# Patient Record
Sex: FEMALE | Race: BLACK OR AFRICAN AMERICAN | NOT HISPANIC OR LATINO | URBAN - METROPOLITAN AREA
[De-identification: names, ages, dates, MRNs, and addresses within clinical notes are randomized per-mention and may not be internally consistent; named-entity substitution may affect disease eponyms.]

---

## 2024-01-13 ENCOUNTER — LAB REQUISITION (OUTPATIENT)
Dept: LAB | Facility: HOSPITAL | Age: 49
End: 2024-01-13
Payer: COMMERCIAL

## 2024-01-13 DIAGNOSIS — R79.89 OTHER SPECIFIED ABNORMAL FINDINGS OF BLOOD CHEMISTRY: ICD-10-CM

## 2024-01-13 LAB
ALBUMIN SERPL BCP-MCNC: 3.5 G/DL (ref 3.4–5)
ANION GAP SERPL CALC-SCNC: 9 MMOL/L (ref 10–20)
BUN SERPL-MCNC: 11 MG/DL (ref 6–23)
CALCIUM SERPL-MCNC: 8.4 MG/DL (ref 8.6–10.3)
CHLORIDE SERPL-SCNC: 108 MMOL/L (ref 98–107)
CO2 SERPL-SCNC: 26 MMOL/L (ref 21–32)
CREAT SERPL-MCNC: 0.56 MG/DL (ref 0.5–1.05)
EGFRCR SERPLBLD CKD-EPI 2021: >90 ML/MIN/1.73M*2
GLUCOSE SERPL-MCNC: 84 MG/DL (ref 74–99)
PHOSPHATE SERPL-MCNC: 3.4 MG/DL (ref 2.5–4.9)
POTASSIUM SERPL-SCNC: 4 MMOL/L (ref 3.5–5.3)
SODIUM SERPL-SCNC: 139 MMOL/L (ref 136–145)

## 2024-01-13 PROCEDURE — 80069 RENAL FUNCTION PANEL: CPT

## 2024-05-15 ENCOUNTER — APPOINTMENT (OUTPATIENT)
Dept: RADIOLOGY | Facility: HOSPITAL | Age: 49
DRG: 698 | End: 2024-05-15
Payer: MEDICARE

## 2024-05-15 ENCOUNTER — HOSPITAL ENCOUNTER (INPATIENT)
Facility: HOSPITAL | Age: 49
LOS: 3 days | Discharge: NURSING, REHAB OR LONG-TERM CARE | DRG: 698 | End: 2024-05-18
Attending: GENERAL PRACTICE | Admitting: SPECIALIST
Payer: MEDICARE

## 2024-05-15 ENCOUNTER — APPOINTMENT (OUTPATIENT)
Dept: CARDIOLOGY | Facility: HOSPITAL | Age: 49
DRG: 698 | End: 2024-05-15
Payer: MEDICARE

## 2024-05-15 DIAGNOSIS — G35 MULTIPLE SCLEROSIS (MULTI): Primary | ICD-10-CM

## 2024-05-15 PROBLEM — R41.82 ALTERED MENTAL STATUS: Status: ACTIVE | Noted: 2024-05-15

## 2024-05-15 PROBLEM — R41.0 CONFUSION: Status: ACTIVE | Noted: 2024-05-15

## 2024-05-15 PROBLEM — D72.829 LEUKOCYTOSIS: Status: ACTIVE | Noted: 2024-05-15

## 2024-05-15 PROBLEM — N39.0 UTI (URINARY TRACT INFECTION): Status: ACTIVE | Noted: 2024-05-15

## 2024-05-15 LAB
ALBUMIN SERPL BCP-MCNC: 3.9 G/DL (ref 3.4–5)
ALP SERPL-CCNC: 89 U/L (ref 33–110)
ALT SERPL W P-5'-P-CCNC: 26 U/L (ref 7–45)
ANION GAP SERPL CALC-SCNC: 14 MMOL/L (ref 10–20)
APPEARANCE UR: ABNORMAL
AST SERPL W P-5'-P-CCNC: 25 U/L (ref 9–39)
B-HCG SERPL-ACNC: 2 MIU/ML
BACTERIA #/AREA URNS AUTO: ABNORMAL /HPF
BASOPHILS # BLD AUTO: 0.03 X10*3/UL (ref 0–0.1)
BASOPHILS NFR BLD AUTO: 0.2 %
BILIRUB SERPL-MCNC: 0.9 MG/DL (ref 0–1.2)
BILIRUB UR STRIP.AUTO-MCNC: NEGATIVE MG/DL
BUN SERPL-MCNC: 15 MG/DL (ref 6–23)
CALCIUM SERPL-MCNC: 8.9 MG/DL (ref 8.6–10.3)
CARDIAC TROPONIN I PNL SERPL HS: <3 NG/L (ref 0–13)
CHLORIDE SERPL-SCNC: 104 MMOL/L (ref 98–107)
CO2 SERPL-SCNC: 25 MMOL/L (ref 21–32)
COLOR UR: YELLOW
CREAT SERPL-MCNC: 0.76 MG/DL (ref 0.5–1.05)
EGFRCR SERPLBLD CKD-EPI 2021: >90 ML/MIN/1.73M*2
EOSINOPHIL # BLD AUTO: 0.01 X10*3/UL (ref 0–0.7)
EOSINOPHIL NFR BLD AUTO: 0.1 %
ERYTHROCYTE [DISTWIDTH] IN BLOOD BY AUTOMATED COUNT: 15.6 % (ref 11.5–14.5)
GLUCOSE BLD MANUAL STRIP-MCNC: 106 MG/DL (ref 74–99)
GLUCOSE SERPL-MCNC: 103 MG/DL (ref 74–99)
GLUCOSE UR STRIP.AUTO-MCNC: NORMAL MG/DL
HCT VFR BLD AUTO: 35.9 % (ref 36–46)
HGB BLD-MCNC: 10.8 G/DL (ref 12–16)
IMM GRANULOCYTES # BLD AUTO: 0.08 X10*3/UL (ref 0–0.7)
IMM GRANULOCYTES NFR BLD AUTO: 0.5 % (ref 0–0.9)
INR PPP: 1.4 (ref 0.9–1.1)
KETONES UR STRIP.AUTO-MCNC: ABNORMAL MG/DL
LEUKOCYTE ESTERASE UR QL STRIP.AUTO: ABNORMAL
LYMPHOCYTES # BLD AUTO: 0.45 X10*3/UL (ref 1.2–4.8)
LYMPHOCYTES NFR BLD AUTO: 3.1 %
MAGNESIUM SERPL-MCNC: 1.7 MG/DL (ref 1.6–2.4)
MCH RBC QN AUTO: 26.4 PG (ref 26–34)
MCHC RBC AUTO-ENTMCNC: 30.1 G/DL (ref 32–36)
MCV RBC AUTO: 88 FL (ref 80–100)
MONOCYTES # BLD AUTO: 1.53 X10*3/UL (ref 0.1–1)
MONOCYTES NFR BLD AUTO: 10.4 %
MUCOUS THREADS #/AREA URNS AUTO: ABNORMAL /LPF
NEUTROPHILS # BLD AUTO: 12.59 X10*3/UL (ref 1.2–7.7)
NEUTROPHILS NFR BLD AUTO: 85.7 %
NITRITE UR QL STRIP.AUTO: NEGATIVE
NRBC BLD-RTO: 0 /100 WBCS (ref 0–0)
PH UR STRIP.AUTO: 7 [PH]
PLATELET # BLD AUTO: 321 X10*3/UL (ref 150–450)
POTASSIUM SERPL-SCNC: 4.2 MMOL/L (ref 3.5–5.3)
PROT SERPL-MCNC: 7.2 G/DL (ref 6.4–8.2)
PROT UR STRIP.AUTO-MCNC: ABNORMAL MG/DL
PROTHROMBIN TIME: 16.3 SECONDS (ref 9.8–12.8)
RBC # BLD AUTO: 4.09 X10*6/UL (ref 4–5.2)
RBC # UR STRIP.AUTO: ABNORMAL /UL
RBC #/AREA URNS AUTO: >20 /HPF
SODIUM SERPL-SCNC: 139 MMOL/L (ref 136–145)
SP GR UR STRIP.AUTO: 1.02
UROBILINOGEN UR STRIP.AUTO-MCNC: ABNORMAL MG/DL
WBC # BLD AUTO: 14.7 X10*3/UL (ref 4.4–11.3)
WBC #/AREA URNS AUTO: >50 /HPF

## 2024-05-15 PROCEDURE — 87086 URINE CULTURE/COLONY COUNT: CPT | Mod: AHULAB | Performed by: GENERAL PRACTICE

## 2024-05-15 PROCEDURE — 82947 ASSAY GLUCOSE BLOOD QUANT: CPT

## 2024-05-15 PROCEDURE — 80053 COMPREHEN METABOLIC PANEL: CPT | Performed by: GENERAL PRACTICE

## 2024-05-15 PROCEDURE — 99285 EMERGENCY DEPT VISIT HI MDM: CPT | Mod: 25

## 2024-05-15 PROCEDURE — 84484 ASSAY OF TROPONIN QUANT: CPT | Performed by: GENERAL PRACTICE

## 2024-05-15 PROCEDURE — 85025 COMPLETE CBC W/AUTO DIFF WBC: CPT | Performed by: GENERAL PRACTICE

## 2024-05-15 PROCEDURE — 70450 CT HEAD/BRAIN W/O DYE: CPT | Performed by: RADIOLOGY

## 2024-05-15 PROCEDURE — 85610 PROTHROMBIN TIME: CPT | Performed by: GENERAL PRACTICE

## 2024-05-15 PROCEDURE — 93005 ELECTROCARDIOGRAM TRACING: CPT

## 2024-05-15 PROCEDURE — 96365 THER/PROPH/DIAG IV INF INIT: CPT

## 2024-05-15 PROCEDURE — 2500000004 HC RX 250 GENERAL PHARMACY W/ HCPCS (ALT 636 FOR OP/ED): Performed by: GENERAL PRACTICE

## 2024-05-15 PROCEDURE — 70450 CT HEAD/BRAIN W/O DYE: CPT

## 2024-05-15 PROCEDURE — 2500000004 HC RX 250 GENERAL PHARMACY W/ HCPCS (ALT 636 FOR OP/ED): Performed by: PHYSICIAN ASSISTANT

## 2024-05-15 PROCEDURE — 84702 CHORIONIC GONADOTROPIN TEST: CPT | Performed by: GENERAL PRACTICE

## 2024-05-15 PROCEDURE — 81001 URINALYSIS AUTO W/SCOPE: CPT | Performed by: GENERAL PRACTICE

## 2024-05-15 PROCEDURE — 36415 COLL VENOUS BLD VENIPUNCTURE: CPT | Performed by: GENERAL PRACTICE

## 2024-05-15 PROCEDURE — 1200000002 HC GENERAL ROOM WITH TELEMETRY DAILY

## 2024-05-15 PROCEDURE — 2500000001 HC RX 250 WO HCPCS SELF ADMINISTERED DRUGS (ALT 637 FOR MEDICARE OP): Performed by: PHYSICIAN ASSISTANT

## 2024-05-15 PROCEDURE — 83735 ASSAY OF MAGNESIUM: CPT | Performed by: GENERAL PRACTICE

## 2024-05-15 RX ORDER — TOPIRAMATE 50 MG/1
50 TABLET, FILM COATED ORAL 2 TIMES DAILY
COMMUNITY

## 2024-05-15 RX ORDER — FERROUS SULFATE 325(65) MG
325 TABLET ORAL
COMMUNITY

## 2024-05-15 RX ORDER — ACETAMINOPHEN 325 MG/1
650 TABLET ORAL EVERY 4 HOURS PRN
COMMUNITY

## 2024-05-15 RX ORDER — ACETAMINOPHEN 325 MG/1
950 TABLET ORAL EVERY 6 HOURS PRN
Status: DISCONTINUED | OUTPATIENT
Start: 2024-05-15 | End: 2024-05-18 | Stop reason: HOSPADM

## 2024-05-15 RX ORDER — BACLOFEN 10 MG/1
TABLET ORAL 3 TIMES DAILY
COMMUNITY

## 2024-05-15 RX ORDER — CEFTRIAXONE 1 G/50ML
1 INJECTION, SOLUTION INTRAVENOUS ONCE
Status: COMPLETED | OUTPATIENT
Start: 2024-05-15 | End: 2024-05-15

## 2024-05-15 RX ORDER — TALC
3 POWDER (GRAM) TOPICAL
Status: DISCONTINUED | OUTPATIENT
Start: 2024-05-16 | End: 2024-05-18 | Stop reason: HOSPADM

## 2024-05-15 RX ORDER — DOCUSATE SODIUM 100 MG/1
100 CAPSULE, LIQUID FILLED ORAL 2 TIMES DAILY
COMMUNITY

## 2024-05-15 RX ORDER — POTASSIUM &MAGNESIUM ASPARTATE 250-250 MG
CAPSULE ORAL
COMMUNITY

## 2024-05-15 RX ORDER — SODIUM CHLORIDE 9 MG/ML
75 INJECTION, SOLUTION INTRAVENOUS CONTINUOUS
Status: DISCONTINUED | OUTPATIENT
Start: 2024-05-15 | End: 2024-05-18 | Stop reason: HOSPADM

## 2024-05-15 RX ORDER — GABAPENTIN 600 MG/1
600 TABLET ORAL 3 TIMES DAILY
COMMUNITY

## 2024-05-15 RX ORDER — SERTRALINE HYDROCHLORIDE 50 MG/1
50 TABLET, FILM COATED ORAL DAILY
COMMUNITY

## 2024-05-15 RX ORDER — CEFTRIAXONE 1 G/50ML
1 INJECTION, SOLUTION INTRAVENOUS EVERY 24 HOURS
Status: DISCONTINUED | OUTPATIENT
Start: 2024-05-15 | End: 2024-05-15 | Stop reason: SDUPTHER

## 2024-05-15 RX ORDER — CEFTRIAXONE 1 G/50ML
1 INJECTION, SOLUTION INTRAVENOUS EVERY 24 HOURS
Status: DISCONTINUED | OUTPATIENT
Start: 2024-05-16 | End: 2024-05-16

## 2024-05-15 RX ORDER — ATORVASTATIN CALCIUM 10 MG/1
10 TABLET, FILM COATED ORAL DAILY
COMMUNITY

## 2024-05-15 RX ORDER — ENOXAPARIN SODIUM 100 MG/ML
40 INJECTION SUBCUTANEOUS EVERY 24 HOURS
Status: DISCONTINUED | OUTPATIENT
Start: 2024-05-15 | End: 2024-05-18 | Stop reason: HOSPADM

## 2024-05-15 RX ORDER — AMLODIPINE BESYLATE 5 MG/1
5 TABLET ORAL DAILY
COMMUNITY

## 2024-05-15 RX ORDER — CILOSTAZOL 50 MG/1
50 TABLET ORAL 2 TIMES DAILY
COMMUNITY

## 2024-05-15 RX ORDER — SIPONIMOD 2 MG/1
2 TABLET, FILM COATED ORAL DAILY
COMMUNITY

## 2024-05-15 RX ORDER — DOCUSATE SODIUM 100 MG/1
100 CAPSULE, LIQUID FILLED ORAL 2 TIMES DAILY
Status: DISCONTINUED | OUTPATIENT
Start: 2024-05-15 | End: 2024-05-18 | Stop reason: HOSPADM

## 2024-05-15 RX ORDER — METOPROLOL SUCCINATE 25 MG/1
25 TABLET, EXTENDED RELEASE ORAL DAILY
COMMUNITY

## 2024-05-15 RX ORDER — PANTOPRAZOLE SODIUM 40 MG/1
40 TABLET, DELAYED RELEASE ORAL
Status: DISCONTINUED | OUTPATIENT
Start: 2024-05-16 | End: 2024-05-18 | Stop reason: HOSPADM

## 2024-05-15 RX ADMIN — DOCUSATE SODIUM 100 MG: 100 CAPSULE, LIQUID FILLED ORAL at 21:55

## 2024-05-15 RX ADMIN — CEFTRIAXONE SODIUM 1 G: 1 INJECTION, SOLUTION INTRAVENOUS at 19:03

## 2024-05-15 RX ADMIN — SODIUM CHLORIDE 75 ML/HR: 9 INJECTION, SOLUTION INTRAVENOUS at 21:55

## 2024-05-15 RX ADMIN — ENOXAPARIN SODIUM 40 MG: 100 INJECTION SUBCUTANEOUS at 21:55

## 2024-05-15 SDOH — SOCIAL STABILITY: SOCIAL INSECURITY: WERE YOU ABLE TO COMPLETE ALL THE BEHAVIORAL HEALTH SCREENINGS?: YES

## 2024-05-15 SDOH — SOCIAL STABILITY: SOCIAL INSECURITY: ARE YOU OR HAVE YOU BEEN THREATENED OR ABUSED PHYSICALLY, EMOTIONALLY, OR SEXUALLY BY ANYONE?: NO

## 2024-05-15 SDOH — SOCIAL STABILITY: SOCIAL INSECURITY: HAVE YOU HAD ANY THOUGHTS OF HARMING ANYONE ELSE?: NO

## 2024-05-15 SDOH — SOCIAL STABILITY: SOCIAL INSECURITY: ABUSE: ADULT

## 2024-05-15 SDOH — SOCIAL STABILITY: SOCIAL INSECURITY: DO YOU FEEL UNSAFE GOING BACK TO THE PLACE WHERE YOU ARE LIVING?: NO

## 2024-05-15 SDOH — SOCIAL STABILITY: SOCIAL INSECURITY: HAVE YOU HAD THOUGHTS OF HARMING ANYONE ELSE?: NO

## 2024-05-15 SDOH — SOCIAL STABILITY: SOCIAL INSECURITY: ARE THERE ANY APPARENT SIGNS OF INJURIES/BEHAVIORS THAT COULD BE RELATED TO ABUSE/NEGLECT?: NO

## 2024-05-15 SDOH — SOCIAL STABILITY: SOCIAL INSECURITY: DOES ANYONE TRY TO KEEP YOU FROM HAVING/CONTACTING OTHER FRIENDS OR DOING THINGS OUTSIDE YOUR HOME?: NO

## 2024-05-15 SDOH — SOCIAL STABILITY: SOCIAL INSECURITY: DO YOU FEEL ANYONE HAS EXPLOITED OR TAKEN ADVANTAGE OF YOU FINANCIALLY OR OF YOUR PERSONAL PROPERTY?: NO

## 2024-05-15 SDOH — SOCIAL STABILITY: SOCIAL INSECURITY: HAS ANYONE EVER THREATENED TO HURT YOUR FAMILY OR YOUR PETS?: NO

## 2024-05-15 ASSESSMENT — COGNITIVE AND FUNCTIONAL STATUS - GENERAL
PATIENT BASELINE BEDBOUND: YES
MOVING TO AND FROM BED TO CHAIR: A LOT
MOBILITY SCORE: 10
WALKING IN HOSPITAL ROOM: TOTAL
STANDING UP FROM CHAIR USING ARMS: TOTAL
TURNING FROM BACK TO SIDE WHILE IN FLAT BAD: A LOT
DAILY ACTIVITIY SCORE: 14
MOVING FROM LYING ON BACK TO SITTING ON SIDE OF FLAT BED WITH BEDRAILS: A LITTLE
CLIMB 3 TO 5 STEPS WITH RAILING: TOTAL
DRESSING REGULAR UPPER BODY CLOTHING: A LOT
PERSONAL GROOMING: A LITTLE
HELP NEEDED FOR BATHING: A LOT
TOILETING: A LOT
DRESSING REGULAR LOWER BODY CLOTHING: A LOT
EATING MEALS: A LITTLE

## 2024-05-15 ASSESSMENT — COLUMBIA-SUICIDE SEVERITY RATING SCALE - C-SSRS
1. IN THE PAST MONTH, HAVE YOU WISHED YOU WERE DEAD OR WISHED YOU COULD GO TO SLEEP AND NOT WAKE UP?: NO
2. HAVE YOU ACTUALLY HAD ANY THOUGHTS OF KILLING YOURSELF?: NO
6. HAVE YOU EVER DONE ANYTHING, STARTED TO DO ANYTHING, OR PREPARED TO DO ANYTHING TO END YOUR LIFE?: NO

## 2024-05-15 ASSESSMENT — PAIN SCALES - GENERAL
PAINLEVEL_OUTOF10: 0 - NO PAIN
PAINLEVEL_OUTOF10: 4
PAINLEVEL_OUTOF10: 0 - NO PAIN

## 2024-05-15 ASSESSMENT — PAIN - FUNCTIONAL ASSESSMENT
PAIN_FUNCTIONAL_ASSESSMENT: 0-10
PAIN_FUNCTIONAL_ASSESSMENT: 0-10

## 2024-05-15 ASSESSMENT — ACTIVITIES OF DAILY LIVING (ADL)
JUDGMENT_ADEQUATE_SAFELY_COMPLETE_DAILY_ACTIVITIES: NO
HEARING - RIGHT EAR: FUNCTIONAL
TOILETING: NEEDS ASSISTANCE
PATIENT'S MEMORY ADEQUATE TO SAFELY COMPLETE DAILY ACTIVITIES?: NO
DRESSING YOURSELF: NEEDS ASSISTANCE
BATHING: NEEDS ASSISTANCE
FEEDING YOURSELF: INDEPENDENT
WALKS IN HOME: DEPENDENT
HEARING - LEFT EAR: FUNCTIONAL
ADEQUATE_TO_COMPLETE_ADL: YES
ASSISTIVE_DEVICE: WHEELCHAIR;EYEGLASSES
LACK_OF_TRANSPORTATION: PATIENT DECLINED
GROOMING: NEEDS ASSISTANCE

## 2024-05-15 ASSESSMENT — PATIENT HEALTH QUESTIONNAIRE - PHQ9
SUM OF ALL RESPONSES TO PHQ9 QUESTIONS 1 & 2: 0
2. FEELING DOWN, DEPRESSED OR HOPELESS: NOT AT ALL
1. LITTLE INTEREST OR PLEASURE IN DOING THINGS: NOT AT ALL

## 2024-05-15 ASSESSMENT — LIFESTYLE VARIABLES
AUDIT-C TOTAL SCORE: 0
HOW OFTEN DO YOU HAVE A DRINK CONTAINING ALCOHOL: NEVER
HOW MANY STANDARD DRINKS CONTAINING ALCOHOL DO YOU HAVE ON A TYPICAL DAY: PATIENT DOES NOT DRINK
AUDIT-C TOTAL SCORE: 0
HOW OFTEN DO YOU HAVE 6 OR MORE DRINKS ON ONE OCCASION: NEVER
SKIP TO QUESTIONS 9-10: 1

## 2024-05-15 ASSESSMENT — PAIN DESCRIPTION - PROGRESSION: CLINICAL_PROGRESSION: NOT CHANGED

## 2024-05-15 ASSESSMENT — PAIN DESCRIPTION - LOCATION: LOCATION: BACK

## 2024-05-15 NOTE — ED TRIAGE NOTES
Pt presents to the ED for possible stroke like s/sx. Pt endorses to EMS that she wanted to leave the care facility. Pts S/SX started 45 minutes prior to arrival. Pt was having one sided ataxia. Pt endorses that she feels like things are locking up. Pt has hx of HTN and MS.

## 2024-05-15 NOTE — ED PROVIDER NOTES
HPI   Chief Complaint   Patient presents with    Stroke       HPI: 48-year-old female with a history of multiple sclerosis presents from her skilled nursing facility with confusion.  As per EMS the patient was noted to become acutely confused approximately 1 hour prior to presentation to the ED.  She has no complaints and is denying headache, chest pain, abdominal pain and fever/chills.  She is normally alert and oriented x 3 and reportedly was alert and oriented to person and place but not time.      Limitations to history: Confusion   independent Historians: EMS  External Records Reviewed: HIE, outpatient notes, inpatient notes  ------------------------------------------------------------------------------------------------------------------------------------------  ROS: a ten point review of systems was performed and was negative except as per HPI.  ------------------------------------------------------------------------------------------------------------------------------------------  PMH / PSH: as per HPI, otherwise reviewed in EMR  MEDS: as per HPI, otherwise reviewed in EMR  ALLERGIES: as per HPI, otherwise reviewed in EMR  SocH:  as per HPI, otherwise reviewed in EMR  FH:  as per HPI, otherwise reviewed in EMR  ------------------------------------------------------------------------------------------------------------------------------------------  Physical Exam:  VS: As documented in the triage note and EMR flowsheet from this visit was reviewed  General: Well appearing. No acute distress.   Eyes:  Extraocular movements grossly intact. No scleral icterus. No discharge  HEENT:  Normocephalic.  Atraumatic  Neck: Moves neck freely. No gross masses  CV: Regular rhythm. No murmurs, rubs or gallops   Resp: Clear to auscultation bilaterally. No respiratory distress.    GI: Soft, no masses, nontender. No rebound tenderness or guarding  MSK: Symmetric muscle bulk. No deformities. No lower extremity edema.    Skin:  Warm, dry, intact.   Neuro: Baseline limited mobility of the bilateral lower extremities.  No facial droop.  No sensory deficit noted.  Ataxia of the right upper extremity.  Psych: Pleasant and conversive  ------------------------------------------------------------------------------------------------------------------------------------------  Hospital Course / Medical Decision Making:  Independent Interpretations: CT head  EKG as interpreted by me: Normal sinus rhythm at 86 bpm with a normal axis, no bundle branch block no signs of acute ischemia    MDM: 48-year-old female with a history of multiple sclerosis who is bedbound presents to [facility] with confusion.  A brain attack was originally called but I do not feel that the patient's presentation is consistent with stroke.  She is having some ataxia of the right upper extremity.  CT shows no acute intracranial process.  I did speak with the patient's primary care physician who reported that this is the patient's neurological baseline.  There is a leukocytosis present.  The patient is afebrile.  Urinalysis is positive for UTI.  Her presentation if there is any confusion from her baseline most likely represents a metabolic encephalopathy from the UTI.  She was given Rocephin in the ED and was admitted to the medicine service for further evaluation.    Discussion of Management with Other Providers:   I discussed the patient/results with: Emergency medicine team    Final diagnosis and disposition as below.    Results for orders placed or performed during the hospital encounter of 05/15/24  -CBC and Auto Differential:        Result                      Value             Ref Range           WBC                         14.7 (H)          4.4 - 11.3 x*       nRBC                        0.0               0.0 - 0.0 /1*       RBC                         4.09              4.00 - 5.20 *       Hemoglobin                  10.8 (L)          12.0 - 16.0 *        Hematocrit                  35.9 (L)          36.0 - 46.0 %       MCV                         88                80 - 100 fL         MCH                         26.4              26.0 - 34.0 *       MCHC                        30.1 (L)          32.0 - 36.0 *       RDW                         15.6 (H)          11.5 - 14.5 %       Platelets                   321               150 - 450 x1*       Neutrophils %               85.7              40.0 - 80.0 %       Immature Granulocytes *     0.5               0.0 - 0.9 %         Lymphocytes %               3.1               13.0 - 44.0 %       Monocytes %                 10.4              2.0 - 10.0 %        Eosinophils %               0.1               0.0 - 6.0 %         Basophils %                 0.2               0.0 - 2.0 %         Neutrophils Absolute        12.59 (H)         1.20 - 7.70 *       Immature Granulocytes *     0.08              0.00 - 0.70 *       Lymphocytes Absolute        0.45 (L)          1.20 - 4.80 *       Monocytes Absolute          1.53 (H)          0.10 - 1.00 *       Eosinophils Absolute        0.01              0.00 - 0.70 *       Basophils Absolute          0.03              0.00 - 0.10 *  -Magnesium:        Result                      Value             Ref Range           Magnesium                   1.70              1.60 - 2.40 *  -Comprehensive metabolic panel:        Result                      Value             Ref Range           Glucose                     103 (H)           74 - 99 mg/dL       Sodium                      139               136 - 145 mm*       Potassium                   4.2               3.5 - 5.3 mm*       Chloride                    104               98 - 107 mmo*       Bicarbonate                 25                21 - 32 mmol*       Anion Gap                   14                10 - 20 mmol*       Urea Nitrogen               15                6 - 23 mg/dL        Creatinine                  0.76              0.50 -  1.05 *       eGFR                        >90               >60 mL/min/1*       Calcium                     8.9               8.6 - 10.3 m*       Albumin                     3.9               3.4 - 5.0 g/*       Alkaline Phosphatase        89                33 - 110 U/L        Total Protein               7.2               6.4 - 8.2 g/*       AST                         25                9 - 39 U/L          Bilirubin, Total            0.9               0.0 - 1.2 mg*       ALT                         26                7 - 45 U/L     -Troponin I, High Sensitivity:        Result                      Value             Ref Range           Troponin I, High Sensi*     <3                0 - 13 ng/L    -Human Chorionic Gonadotropin, Serum Quantitative:        Result                      Value             Ref Range           HCG, Beta-Quantitative      2                 <5 mIU/mL      -Urinalysis with Reflex Culture and Microscopic:        Result                      Value             Ref Range           Color, Urine                Yellow            Light-Yellow*       Appearance, Urine           Turbid (N)        Clear               Specific Gravity, Urine     1.020             1.005 - 1.035       pH, Urine                   7.0               5.0, 5.5, 6.*       Protein, Urine              100 (2+) (A)      NEGATIVE, 10*       Glucose, Urine              Normal            Normal mg/dL        Blood, Urine                0.5 (2+) (A)      NEGATIVE            Ketones, Urine              TRACE (A)         NEGATIVE mg/*       Bilirubin, Urine            NEGATIVE          NEGATIVE            Urobilinogen, Urine         6 (2+) (A)        Normal mg/dL        Nitrite, Urine              NEGATIVE          NEGATIVE            Leukocyte Esterase, Ur*                       NEGATIVE        500 Jennifer/µL (A)  -Protime-INR:        Result                      Value             Ref Range           Protime                     16.3 (H)          9.8  - 12.8 s*       INR                         1.4 (H)           0.9 - 1.1      -Microscopic Only, Urine:        Result                      Value             Ref Range           WBC, Urine                  >50 (A)           1-5, NONE /H*       RBC, Urine                  >20 (A)           NONE, 1-2, 3*       Bacteria, Urine             2+ (A)            NONE SEEN /H*       Mucus, Urine                2+                Reference ra*  -POCT GLUCOSE:        Result                      Value             Ref Range           POCT Glucose                106 (H)           74 - 99 mg/dL  CT brain attack head wo IV contrast   Final Result    No acute intracranial finding. Progression of findings associated    with multiple sclerosis compared to the exam in 2020          No evidence of intracranial hemorrhage or displaced skull fracture.          MACRO:    Joseph Schoenberger discussed the significance and urgency of this    critical finding by telephone with  MICHELLE CRENSHAW on 5/15/2024 at 4:32    pm.  (**-RCF-**) Findings:  BAT.                Signed by: Joseph Schoenberger 5/15/2024 4:32 PM    Dictation workstation:   [id number]                               [place] Coma Scale Score: 14         NIH Stroke Scale: 6             Patient History   Past Medical History:   Diagnosis Date    Acute disseminated demyelination (Multi)     Chronic pain syndrome     Cognitive communication deficit     Constipation     Depression     Dysphagia     Edema     Hypertension     MS (multiple sclerosis) (Multi)     Neuromuscular dysfunction of bladder     Personal history of urinary (tract) infections     Repeated falls     Urinary incontinence      History reviewed. No pertinent surgical history.  No family history on file.  Social History     Tobacco Use    Smoking status: Former     Current packs/day: 1.00     Types: Cigarettes    Smokeless tobacco: Never   Substance Use Topics    Alcohol use: Not on file    Drug use: Not Currently     Types:  Marijuana       Physical Exam   ED Triage Vitals   Temperature Heart Rate Respirations BP   05/15/24 1618 05/15/24 1618 05/15/24 1618 05/15/24 1618   37.7 °C (99.9 °F) 88 16 125/75      Pulse Ox Temp src Heart Rate Source Patient Position   05/15/24 1618 -- 05/15/24 1634 --   98 %  Monitor       BP Location FiO2 (%)     -- --             Physical Exam    ED Course & MDM   Diagnoses as of 05/15/24 1959   Multiple sclerosis (Multi)       Medical Decision Making      Procedure  Procedures     Monroe Hancock,   05/19/24 8775

## 2024-05-16 LAB
ALBUMIN SERPL BCP-MCNC: 3.6 G/DL (ref 3.4–5)
ALP SERPL-CCNC: 73 U/L (ref 33–110)
ALT SERPL W P-5'-P-CCNC: 20 U/L (ref 7–45)
ANION GAP SERPL CALC-SCNC: 14 MMOL/L (ref 10–20)
AST SERPL W P-5'-P-CCNC: 14 U/L (ref 9–39)
BASOPHILS # BLD AUTO: 0.03 X10*3/UL (ref 0–0.1)
BASOPHILS NFR BLD AUTO: 0.2 %
BILIRUB SERPL-MCNC: 0.6 MG/DL (ref 0–1.2)
BUN SERPL-MCNC: 12 MG/DL (ref 6–23)
CALCIUM SERPL-MCNC: 8.1 MG/DL (ref 8.6–10.3)
CHLORIDE SERPL-SCNC: 106 MMOL/L (ref 98–107)
CO2 SERPL-SCNC: 22 MMOL/L (ref 21–32)
CREAT SERPL-MCNC: 0.55 MG/DL (ref 0.5–1.05)
EGFRCR SERPLBLD CKD-EPI 2021: >90 ML/MIN/1.73M*2
EOSINOPHIL # BLD AUTO: 0.01 X10*3/UL (ref 0–0.7)
EOSINOPHIL NFR BLD AUTO: 0.1 %
ERYTHROCYTE [DISTWIDTH] IN BLOOD BY AUTOMATED COUNT: 15.5 % (ref 11.5–14.5)
GLUCOSE SERPL-MCNC: 104 MG/DL (ref 74–99)
HCT VFR BLD AUTO: 32.3 % (ref 36–46)
HGB BLD-MCNC: 9.7 G/DL (ref 12–16)
HOLD SPECIMEN: NORMAL
IMM GRANULOCYTES # BLD AUTO: 0.07 X10*3/UL (ref 0–0.7)
IMM GRANULOCYTES NFR BLD AUTO: 0.6 % (ref 0–0.9)
IRON SATN MFR SERPL: ABNORMAL %
IRON SERPL-MCNC: <10 UG/DL (ref 35–150)
LYMPHOCYTES # BLD AUTO: 0.39 X10*3/UL (ref 1.2–4.8)
LYMPHOCYTES NFR BLD AUTO: 3.2 %
MCH RBC QN AUTO: 26.2 PG (ref 26–34)
MCHC RBC AUTO-ENTMCNC: 30 G/DL (ref 32–36)
MCV RBC AUTO: 87 FL (ref 80–100)
MONOCYTES # BLD AUTO: 1.28 X10*3/UL (ref 0.1–1)
MONOCYTES NFR BLD AUTO: 10.6 %
NEUTROPHILS # BLD AUTO: 10.32 X10*3/UL (ref 1.2–7.7)
NEUTROPHILS NFR BLD AUTO: 85.3 %
NRBC BLD-RTO: 0 /100 WBCS (ref 0–0)
PLATELET # BLD AUTO: 293 X10*3/UL (ref 150–450)
POTASSIUM SERPL-SCNC: 3.5 MMOL/L (ref 3.5–5.3)
PROT SERPL-MCNC: 6.4 G/DL (ref 6.4–8.2)
RBC # BLD AUTO: 3.7 X10*6/UL (ref 4–5.2)
SODIUM SERPL-SCNC: 138 MMOL/L (ref 136–145)
TIBC SERPL-MCNC: ABNORMAL UG/DL
UIBC SERPL-MCNC: 182 UG/DL (ref 110–370)
WBC # BLD AUTO: 12.1 X10*3/UL (ref 4.4–11.3)

## 2024-05-16 PROCEDURE — 1200000002 HC GENERAL ROOM WITH TELEMETRY DAILY

## 2024-05-16 PROCEDURE — 83540 ASSAY OF IRON: CPT | Performed by: SPECIALIST

## 2024-05-16 PROCEDURE — 2500000001 HC RX 250 WO HCPCS SELF ADMINISTERED DRUGS (ALT 637 FOR MEDICARE OP): Performed by: SPECIALIST

## 2024-05-16 PROCEDURE — 85025 COMPLETE CBC W/AUTO DIFF WBC: CPT | Performed by: PHYSICIAN ASSISTANT

## 2024-05-16 PROCEDURE — 2500000001 HC RX 250 WO HCPCS SELF ADMINISTERED DRUGS (ALT 637 FOR MEDICARE OP): Performed by: PHYSICIAN ASSISTANT

## 2024-05-16 PROCEDURE — 82728 ASSAY OF FERRITIN: CPT | Mod: AHULAB | Performed by: SPECIALIST

## 2024-05-16 PROCEDURE — 2500000004 HC RX 250 GENERAL PHARMACY W/ HCPCS (ALT 636 FOR OP/ED): Performed by: PHARMACIST

## 2024-05-16 PROCEDURE — 99223 1ST HOSP IP/OBS HIGH 75: CPT | Performed by: PSYCHIATRY & NEUROLOGY

## 2024-05-16 PROCEDURE — 36415 COLL VENOUS BLD VENIPUNCTURE: CPT | Performed by: PHYSICIAN ASSISTANT

## 2024-05-16 PROCEDURE — 80053 COMPREHEN METABOLIC PANEL: CPT | Performed by: PHYSICIAN ASSISTANT

## 2024-05-16 PROCEDURE — 2500000004 HC RX 250 GENERAL PHARMACY W/ HCPCS (ALT 636 FOR OP/ED): Performed by: PHYSICIAN ASSISTANT

## 2024-05-16 RX ORDER — POTASSIUM CHLORIDE 1.5 G/1.58G
40 POWDER, FOR SOLUTION ORAL ONCE
Status: DISCONTINUED | OUTPATIENT
Start: 2024-05-16 | End: 2024-05-18 | Stop reason: HOSPADM

## 2024-05-16 RX ORDER — GABAPENTIN 300 MG/1
600 CAPSULE ORAL 3 TIMES DAILY
Status: DISCONTINUED | OUTPATIENT
Start: 2024-05-16 | End: 2024-05-18 | Stop reason: HOSPADM

## 2024-05-16 RX ORDER — AMLODIPINE BESYLATE 5 MG/1
5 TABLET ORAL DAILY
Status: DISCONTINUED | OUTPATIENT
Start: 2024-05-16 | End: 2024-05-18 | Stop reason: HOSPADM

## 2024-05-16 RX ORDER — BACLOFEN 10 MG/1
10 TABLET ORAL 3 TIMES DAILY
Status: DISCONTINUED | OUTPATIENT
Start: 2024-05-16 | End: 2024-05-18 | Stop reason: HOSPADM

## 2024-05-16 RX ORDER — CILOSTAZOL 100 MG/1
50 TABLET ORAL 2 TIMES DAILY
Status: DISCONTINUED | OUTPATIENT
Start: 2024-05-16 | End: 2024-05-18 | Stop reason: HOSPADM

## 2024-05-16 RX ORDER — FERROUS SULFATE 325(65) MG
1 TABLET ORAL
Status: DISCONTINUED | OUTPATIENT
Start: 2024-05-17 | End: 2024-05-18 | Stop reason: HOSPADM

## 2024-05-16 RX ORDER — TOPIRAMATE 25 MG/1
50 TABLET ORAL 2 TIMES DAILY
Status: DISCONTINUED | OUTPATIENT
Start: 2024-05-16 | End: 2024-05-18 | Stop reason: HOSPADM

## 2024-05-16 RX ORDER — METOPROLOL SUCCINATE 25 MG/1
25 TABLET, EXTENDED RELEASE ORAL DAILY
Status: DISCONTINUED | OUTPATIENT
Start: 2024-05-16 | End: 2024-05-18 | Stop reason: HOSPADM

## 2024-05-16 RX ORDER — ATORVASTATIN CALCIUM 10 MG/1
10 TABLET, FILM COATED ORAL DAILY
Status: DISCONTINUED | OUTPATIENT
Start: 2024-05-16 | End: 2024-05-18 | Stop reason: HOSPADM

## 2024-05-16 RX ORDER — SERTRALINE HYDROCHLORIDE 50 MG/1
50 TABLET, FILM COATED ORAL DAILY
Status: DISCONTINUED | OUTPATIENT
Start: 2024-05-16 | End: 2024-05-18 | Stop reason: HOSPADM

## 2024-05-16 RX ADMIN — BACLOFEN 10 MG: 10 TABLET ORAL at 18:07

## 2024-05-16 RX ADMIN — SODIUM CHLORIDE 75 ML/HR: 9 INJECTION, SOLUTION INTRAVENOUS at 21:20

## 2024-05-16 RX ADMIN — DOCUSATE SODIUM 100 MG: 100 CAPSULE, LIQUID FILLED ORAL at 21:20

## 2024-05-16 RX ADMIN — Medication 3 MG: at 21:20

## 2024-05-16 RX ADMIN — CEFTRIAXONE 2 G: 2 INJECTION, POWDER, FOR SOLUTION INTRAMUSCULAR; INTRAVENOUS at 21:23

## 2024-05-16 RX ADMIN — BACLOFEN 10 MG: 10 TABLET ORAL at 21:20

## 2024-05-16 RX ADMIN — PANTOPRAZOLE SODIUM 40 MG: 40 TABLET, DELAYED RELEASE ORAL at 05:46

## 2024-05-16 RX ADMIN — ENOXAPARIN SODIUM 40 MG: 100 INJECTION SUBCUTANEOUS at 21:19

## 2024-05-16 RX ADMIN — GABAPENTIN 600 MG: 300 CAPSULE ORAL at 21:20

## 2024-05-16 RX ADMIN — GABAPENTIN 600 MG: 300 CAPSULE ORAL at 18:08

## 2024-05-16 ASSESSMENT — PAIN SCALES - GENERAL: PAINLEVEL_OUTOF10: 0 - NO PAIN

## 2024-05-16 NOTE — CONSULTS
"INITIAL NEUROLOGY CONSULT NOTE    IMPRESSION:  Transient encephalopathy in the context of low-grade fever and systemic infection, which is being treated.  Multiple sclerosis with paraparesis, nystagmus and early-onset dementia.  The presentation is not suggestive of either MS exacerbation or stroke/TIA.    RECOMMENDATIONS:  No acute neurological intervention required at this time.  I am happy to see the patient again as needed or requested.  Can follow up with the neurology team at [facility] on an outpatient basis.    Estrada Trevino Jr., M.D., FAAN       History Of Present Illness  Fely Palacios is a 48 y.o. female presenting with history of multiple sclerosis.  The patient is unable to provide information, there are no notes on the chart indicating what happened, and the facility the patient came from sent no information.  I reviewed the EMS run sheet and the available EMR.  I also spoke with her nurse from the [facility], where the patient resides.    Yesterday, an STNA noted that the patient was more confused than usual in that she was asking the same question multiple times, and could not initially recognize staff members she knows.  She was last known well 40 minutes prior.  Vitals at the time were /89, pulse 88, resp 16, temp 99.6 F, and SpO2 98% on room air.    The EMS run sheet states they were called for altered mentation while at a SNF.  The patient was able to tell the paramedics that \"I just wanted to get out of there\".  She presented to the [facility] [place] and was admitted for purported stroke workup.  She was found to have a UTI and was started on antibiotics.    The NH nurse reports that the patient, at baseline speaks but has significant short-term memory issues.  She does not walk because of spasticity presumed referable to chronic MS, and she can use both arms.  The nurse indicates no recent medications.    EMR review reveals a prior history of MS, for which she is managed at [facility].  " She is on siponimod and last saw Dr. Knox in 3/2024.    Past Medical History  Past Medical History:   Diagnosis Date    Acute disseminated demyelination (Multi)     Chronic pain syndrome     Cognitive communication deficit     Constipation     Depression     Dysphagia     Edema     Hypertension     MS (multiple sclerosis) (Multi)     Neuromuscular dysfunction of bladder     Personal history of urinary (tract) infections     Repeated falls     Urinary incontinence      Surgical History  History reviewed. No pertinent surgical history.  Social History  Social History     Tobacco Use    Smoking status: Former     Current packs/day: 1.00     Types: Cigarettes    Smokeless tobacco: Never   Substance Use Topics    Drug use: Not Currently     Types: Marijuana       Scheduled medications  amLODIPine, 5 mg, oral, Daily  atorvastatin, 10 mg, oral, Daily  baclofen, 10 mg, oral, TID  cefTRIAXone, 2 g, intravenous, q24h  cilostazol, 50 mg, oral, BID  docusate sodium, 100 mg, oral, BID  enoxaparin, 40 mg, subcutaneous, q24h  [START ON 5/17/2024] ferrous sulfate (325 mg ferrous sulfate), 1 tablet, oral, Daily with breakfast  gabapentin, 600 mg, oral, TID  melatonin, 3 mg, oral, Daily  metoprolol succinate XL, 25 mg, oral, Daily  pantoprazole, 40 mg, oral, Daily before breakfast  potassium chloride, 40 mEq, oral, Once  sertraline, 50 mg, oral, Daily  topiramate, 50 mg, oral, BID      Continuous medications  sodium chloride 0.9%, 75 mL/hr, Last Rate: 75 mL/hr (05/15/24 2155)      PRN medications  PRN medications: acetaminophen      No family history on file.  Allergies  Shellfish derived  Medications Prior to Admission   Medication Sig Dispense Refill Last Dose    acetaminophen (Tylenol) 325 mg tablet Take 2 tablets (650 mg) by mouth every 4 hours if needed.       amLODIPine (Norvasc) 5 mg tablet Take 1 tablet (5 mg) by mouth once daily.       atorvastatin (Lipitor) 10 mg tablet Take 1 tablet (10 mg) by mouth once daily.        "baclofen (Lioresal) 10 mg tablet Take by mouth 3 times a day.       cilostazol (Pletal) 50 mg tablet Take 1 tablet (50 mg) by mouth 2 times a day.       cranberry 500 mg capsule Take by mouth.       docusate sodium (Colace) 100 mg capsule Take 1 capsule (100 mg) by mouth 2 times a day.       ferrous sulfate, 325 mg ferrous sulfate, tablet Take 1 tablet by mouth once daily with breakfast.       gabapentin (Neurontin) 600 mg tablet Take 1 tablet (600 mg) by mouth 3 times a day.       metoprolol succinate XL (Toprol-XL) 25 mg 24 hr tablet Take 1 tablet (25 mg) by mouth once daily. Do not crush or chew.       sertraline (Zoloft) 50 mg tablet Take 1 tablet (50 mg) by mouth once daily.       siponimod (Mayzent) 2 mg tablet tablet Take 1 tablet (2 mg) by mouth once daily.       topiramate (Topamax) 50 mg tablet Take 1 tablet (50 mg) by mouth 2 times a day.          Scheduled medications  amLODIPine, 5 mg, oral, Daily  atorvastatin, 10 mg, oral, Daily  baclofen, 10 mg, oral, TID  cefTRIAXone, 2 g, intravenous, q24h  cilostazol, 50 mg, oral, BID  docusate sodium, 100 mg, oral, BID  enoxaparin, 40 mg, subcutaneous, q24h  [START ON 5/17/2024] ferrous sulfate (325 mg ferrous sulfate), 1 tablet, oral, Daily with breakfast  gabapentin, 600 mg, oral, TID  melatonin, 3 mg, oral, Daily  metoprolol succinate XL, 25 mg, oral, Daily  pantoprazole, 40 mg, oral, Daily before breakfast  potassium chloride, 40 mEq, oral, Once  sertraline, 50 mg, oral, Daily  topiramate, 50 mg, oral, BID      Continuous medications  sodium chloride 0.9%, 75 mL/hr, Last Rate: 75 mL/hr (05/15/24 2155)      PRN medications  PRN medications: acetaminophen    Review of Systems   Unable to perform ROS: Mental status change       Last Recorded Vitals  Blood pressure 133/72, pulse 88, temperature 36.9 °C (98.4 °F), temperature source Temporal, resp. rate 18, height 1.753 m (5' 9\"), weight 108 kg (237 lb 7 oz), SpO2 98%.    CONSTITUTIONAL:  No acute " distress    CARDIOVASCULAR:  Reduced pulses in the distal legs, no edema of either arm or either leg.  No carotid bruits.    MENTAL STATUS:  Awake, alert, oriented to self, but not to place or time, with poor short-term memory (0/3 5-minute recall), no awareness of recent events, normal attention span, concentration, and reduced fund of knowledge.    SPEECH AND LANGUAGE:  Can name and repeat, follows all commands, has no dysarthria    FUNDOSCOPIC:  No papilledema    CRANIAL NERVES:  II-Vision present, visual fields full to confrontational testing    III/IV/VI--EOMs are present in all directions.  She has horizontal nystagmus with eyes in primary position, and this persists with gaze to both sides.  Pupils are symmetrically reactive in dim light.  No ptosis.    V--Normal facial sensation.    VII--No facial asymmetry.    VIII--Hearing present to voice bilaterally.    IX/X--Symmetric soft palate rise.    XI--Normal trapezius power bilaterally.    XII--Tongue protrudes without deviation.    MOTOR:  Hypertonia of both legs.  Contracture at the right knee.  Contracture of both feet in plantarflexion, worse on the left.  0/5 iliacus bilaterally.  5/5 hip abduction and adduction bilaterally.  4/5 knee flexion and extension left.  2/5 knee extension and 4/5 knee flexion right.      Normal power, tone, and bulk in both arms.    SENSORY:  Present  pin sensation in both arms and both legs without distal-proximal gradient, asymmetry, or spinal sensory level.    COORDINATION:  Normal finger-to-nose testing in both arms.  Unable to do heel-to-shin testing in either leg because of paraparesis.    REFLEXES are symmetrically depressed at the biceps, triceps, brachioradialis, absent at the patella, and ankle.  The plantar responses are equivocal.    GAIT deferred.    Relevant Results  Results for orders placed or performed during the hospital encounter of 05/15/24 (from the past 24 hour(s))   POCT GLUCOSE   Result Value Ref Range     POCT Glucose 106 (H) 74 - 99 mg/dL   Electrocardiogram, 12-lead PRN ACS symptoms   Result Value Ref Range    Ventricular Rate 86 BPM    Atrial Rate 86 BPM    DC Interval 158 ms    QRS Duration 88 ms    QT Interval 348 ms    QTC Calculation(Bazett) 416 ms    P Axis 32 degrees    R Axis 8 degrees    T Axis 18 degrees    QRS Count 14 beats    Q Onset 215 ms    P Onset 136 ms    P Offset 195 ms    T Offset 389 ms    QTC Fredericia 392 ms   CBC and Auto Differential   Result Value Ref Range    WBC 14.7 (H) 4.4 - 11.3 x10*3/uL    nRBC 0.0 0.0 - 0.0 /100 WBCs    RBC 4.09 4.00 - 5.20 x10*6/uL    Hemoglobin 10.8 (L) 12.0 - 16.0 g/dL    Hematocrit 35.9 (L) 36.0 - 46.0 %    MCV 88 80 - 100 fL    MCH 26.4 26.0 - 34.0 pg    MCHC 30.1 (L) 32.0 - 36.0 g/dL    RDW 15.6 (H) 11.5 - 14.5 %    Platelets 321 150 - 450 x10*3/uL    Neutrophils % 85.7 40.0 - 80.0 %    Immature Granulocytes %, Automated 0.5 0.0 - 0.9 %    Lymphocytes % 3.1 13.0 - 44.0 %    Monocytes % 10.4 2.0 - 10.0 %    Eosinophils % 0.1 0.0 - 6.0 %    Basophils % 0.2 0.0 - 2.0 %    Neutrophils Absolute 12.59 (H) 1.20 - 7.70 x10*3/uL    Immature Granulocytes Absolute, Automated 0.08 0.00 - 0.70 x10*3/uL    Lymphocytes Absolute 0.45 (L) 1.20 - 4.80 x10*3/uL    Monocytes Absolute 1.53 (H) 0.10 - 1.00 x10*3/uL    Eosinophils Absolute 0.01 0.00 - 0.70 x10*3/uL    Basophils Absolute 0.03 0.00 - 0.10 x10*3/uL   Magnesium   Result Value Ref Range    Magnesium 1.70 1.60 - 2.40 mg/dL   Comprehensive metabolic panel   Result Value Ref Range    Glucose 103 (H) 74 - 99 mg/dL    Sodium 139 136 - 145 mmol/L    Potassium 4.2 3.5 - 5.3 mmol/L    Chloride 104 98 - 107 mmol/L    Bicarbonate 25 21 - 32 mmol/L    Anion Gap 14 10 - 20 mmol/L    Urea Nitrogen 15 6 - 23 mg/dL    Creatinine 0.76 0.50 - 1.05 mg/dL    eGFR >90 >60 mL/min/1.73m*2    Calcium 8.9 8.6 - 10.3 mg/dL    Albumin 3.9 3.4 - 5.0 g/dL    Alkaline Phosphatase 89 33 - 110 U/L    Total Protein 7.2 6.4 - 8.2 g/dL    AST 25 9 - 39  U/L    Bilirubin, Total 0.9 0.0 - 1.2 mg/dL    ALT 26 7 - 45 U/L   Troponin I, High Sensitivity   Result Value Ref Range    Troponin I, High Sensitivity <3 0 - 13 ng/L   Human Chorionic Gonadotropin, Serum Quantitative   Result Value Ref Range    HCG, Beta-Quantitative 2 <5 mIU/mL   Protime-INR   Result Value Ref Range    Protime 16.3 (H) 9.8 - 12.8 seconds    INR 1.4 (H) 0.9 - 1.1   Urinalysis with Reflex Culture and Microscopic   Result Value Ref Range    Color, Urine Yellow Light-Yellow, Yellow, Dark-Yellow    Appearance, Urine Turbid (N) Clear    Specific Gravity, Urine 1.020 1.005 - 1.035    pH, Urine 7.0 5.0, 5.5, 6.0, 6.5, 7.0, 7.5, 8.0    Protein, Urine 100 (2+) (A) NEGATIVE, 10 (TRACE), 20 (TRACE) mg/dL    Glucose, Urine Normal Normal mg/dL    Blood, Urine 0.5 (2+) (A) NEGATIVE    Ketones, Urine TRACE (A) NEGATIVE mg/dL    Bilirubin, Urine NEGATIVE NEGATIVE    Urobilinogen, Urine 6 (2+) (A) Normal mg/dL    Nitrite, Urine NEGATIVE NEGATIVE    Leukocyte Esterase, Urine 500 Jennifer/µL (A) NEGATIVE   Extra Urine Gray Tube   Result Value Ref Range    Extra Tube Hold for add-ons.    Microscopic Only, Urine   Result Value Ref Range    WBC, Urine >50 (A) 1-5, NONE /HPF    RBC, Urine >20 (A) NONE, 1-2, 3-5 /HPF    Bacteria, Urine 2+ (A) NONE SEEN /HPF    Mucus, Urine 2+ Reference range not established. /LPF   Comprehensive metabolic panel   Result Value Ref Range    Glucose 104 (H) 74 - 99 mg/dL    Sodium 138 136 - 145 mmol/L    Potassium 3.5 3.5 - 5.3 mmol/L    Chloride 106 98 - 107 mmol/L    Bicarbonate 22 21 - 32 mmol/L    Anion Gap 14 10 - 20 mmol/L    Urea Nitrogen 12 6 - 23 mg/dL    Creatinine 0.55 0.50 - 1.05 mg/dL    eGFR >90 >60 mL/min/1.73m*2    Calcium 8.1 (L) 8.6 - 10.3 mg/dL    Albumin 3.6 3.4 - 5.0 g/dL    Alkaline Phosphatase 73 33 - 110 U/L    Total Protein 6.4 6.4 - 8.2 g/dL    AST 14 9 - 39 U/L    Bilirubin, Total 0.6 0.0 - 1.2 mg/dL    ALT 20 7 - 45 U/L   CBC and Auto Differential   Result Value Ref  Range    WBC 12.1 (H) 4.4 - 11.3 x10*3/uL    nRBC 0.0 0.0 - 0.0 /100 WBCs    RBC 3.70 (L) 4.00 - 5.20 x10*6/uL    Hemoglobin 9.7 (L) 12.0 - 16.0 g/dL    Hematocrit 32.3 (L) 36.0 - 46.0 %    MCV 87 80 - 100 fL    MCH 26.2 26.0 - 34.0 pg    MCHC 30.0 (L) 32.0 - 36.0 g/dL    RDW 15.5 (H) 11.5 - 14.5 %    Platelets 293 150 - 450 x10*3/uL    Neutrophils % 85.3 40.0 - 80.0 %    Immature Granulocytes %, Automated 0.6 0.0 - 0.9 %    Lymphocytes % 3.2 13.0 - 44.0 %    Monocytes % 10.6 2.0 - 10.0 %    Eosinophils % 0.1 0.0 - 6.0 %    Basophils % 0.2 0.0 - 2.0 %    Neutrophils Absolute 10.32 (H) 1.20 - 7.70 x10*3/uL    Immature Granulocytes Absolute, Automated 0.07 0.00 - 0.70 x10*3/uL    Lymphocytes Absolute 0.39 (L) 1.20 - 4.80 x10*3/uL    Monocytes Absolute 1.28 (H) 0.10 - 1.00 x10*3/uL    Eosinophils Absolute 0.01 0.00 - 0.70 x10*3/uL    Basophils Absolute 0.03 0.00 - 0.10 x10*3/uL         I have personally reviewed the following imaging results:     Cranial CT yesterday:  IMPRESSION:  No acute intracranial finding. Progression of findings associated  with multiple sclerosis compared to the exam in 2020      No evidence of intracranial hemorrhage or displaced skull fracture.      Estrada Trevino Jr., M.D., FAAN

## 2024-05-16 NOTE — NURSING NOTE
Patient admitted to 6th floor, [facility], disoriented to place and time. Patient is unsure of dewey, allergies, medications and POA phone numbers. Patient had no attending provider listed and no active admission orders. Patient has Kristie listed as her PCP, so nursing supervisor instructed this RN to add him as attending. Lisa Martinez messaged to notify of patient's arrival and to place orders. Response pending.

## 2024-05-16 NOTE — PROGRESS NOTES
05/16/24 1005   Discharge Planning   Living Arrangements Alone   Support Systems Family members   Assistance Needed Spoke to patient mother and she stated patient at baseline is alert and can have some confusion due to her MS. Patient is wheelchair bound and can transfer with assistance.   Type of Residence Nursing home/residential care   Do you have animals or pets at home? No   Who is requesting discharge planning? Provider   Home or Post Acute Services Post acute facilities (Rehab/SNF/etc)   Type of Post Acute Facility Services Long term care   Patient expects to be discharged to: Patient is from [facility]. Per mother plan once medically clear is to D/C back to [place] where patient is LTC.   Does the patient need discharge transport arranged? Yes   RoundTrip coordination needed? Yes   Has discharge transport been arranged? No   Patient Choice   Provider Choice list and CMS website (https://medicare.gov/care-compare#search) for post-acute Quality and Resource Measure Data were provided and reviewed with: Family   Patient / Family choosing to utilize agency / facility established prior to hospitalization Yes

## 2024-05-16 NOTE — CARE PLAN
Problem: Nutrition  Goal: Less than 5 days NPO/clear liquids  Outcome: Progressing  Goal: Oral intake greater than 50%  Outcome: Progressing  Goal: Oral intake greater 75%  Outcome: Progressing  Goal: Consume prescribed supplement  Outcome: Progressing  Goal: Adequate PO fluid intake  Outcome: Progressing  Goal: Nutrition support goals are met within 48 hrs  Outcome: Progressing  Goal: Nutrition support is meeting 75% of nutrient needs  Outcome: Progressing  Goal: Tube feed tolerance  Outcome: Progressing  Goal: BG  mg/dL  Outcome: Progressing  Goal: Lab values WNL  Outcome: Progressing  Goal: Electrolytes WNL  Outcome: Progressing  Goal: Promote healing  Outcome: Progressing  Goal: Maintain stable weight  Outcome: Progressing  Goal: Reduce weight from edema/fluid  Outcome: Progressing  Goal: Gradual weight gain  Outcome: Progressing  Goal: Improve ostomy output  Outcome: Progressing     Problem: Pain - Adult  Goal: Verbalizes/displays adequate comfort level or baseline comfort level  Outcome: Progressing     Problem: Safety - Adult  Goal: Free from fall injury  Outcome: Progressing     Problem: Discharge Planning  Goal: Discharge to home or other facility with appropriate resources  Outcome: Progressing     Problem: Chronic Conditions and Co-morbidities  Goal: Patient's chronic conditions and co-morbidity symptoms are monitored and maintained or improved  Outcome: Progressing   The patient's goals for the shift include  maintain pt comfort and safety     The clinical goals for the shift include maintaining pt comfort

## 2024-05-16 NOTE — H&P
HISTORY AND PHYSICAL EXAMINATION - INTERNAL MEDICINE    PATIENT NAME: Fely Palacios    MRN: [id number]  SERVICE DATE: 5/16/2024   SERVICE TIME:  10:43 AM    PRIMARY CARE PHYSICIAN:  Cheko Flowers MD    ASSESSMENT & PLAN     48 yr old female with change in ms --> metabolic encephalopathy vs MS flare up, neurology consult     Ms   Cognitive communication deficit   Anemia unspecified   Possible uti 2/2 urostomy --UC pending   Constipation   Obesity   Depression   Htn              SUBJECTIVE  CHIEF COMPLAINT:   change  in ms    HISTORY OF PRESENT ILLNESS:  Ms. Palacios is a 48 y.o. female who presents with change in ms from [facility]    PAST MEDICAL HISTORY:    Past Medical History:   Diagnosis Date    Acute disseminated demyelination (Multi)     Chronic pain syndrome     Cognitive communication deficit     Constipation     Depression     Dysphagia     Edema     Hypertension     MS (multiple sclerosis) (Multi)     Neuromuscular dysfunction of bladder     Personal history of urinary (tract) infections     Repeated falls     Urinary incontinence        PAST SURGICAL HISTORY:  urostomy     FAMILY HISTORY:  not significant for current medical problem     SOCIAL HISTORY:  non smoker or drinker, lives at [facility]    MEDICATIONS:  Medications Prior to Admission   Medication Sig Dispense Refill Last Dose    acetaminophen (Tylenol) 325 mg tablet Take 2 tablets (650 mg) by mouth every 4 hours if needed.       amLODIPine (Norvasc) 5 mg tablet Take 1 tablet (5 mg) by mouth once daily.       atorvastatin (Lipitor) 10 mg tablet Take 1 tablet (10 mg) by mouth once daily.       baclofen (Lioresal) 10 mg tablet Take by mouth 3 times a day.       cilostazol (Pletal) 50 mg tablet Take 1 tablet (50 mg) by mouth 2 times a day.       cranberry 500 mg capsule Take by mouth.       docusate sodium (Colace) 100 mg capsule Take 1 capsule (100 mg) by mouth 2 times a day.       ferrous sulfate, 325 mg ferrous sulfate, tablet Take 1 tablet by  mouth once daily with breakfast.       gabapentin (Neurontin) 600 mg tablet Take 1 tablet (600 mg) by mouth 3 times a day.       metoprolol succinate XL (Toprol-XL) 25 mg 24 hr tablet Take 1 tablet (25 mg) by mouth once daily. Do not crush or chew.       sertraline (Zoloft) 50 mg tablet Take 1 tablet (50 mg) by mouth once daily.       siponimod (Mayzent) 2 mg tablet tablet Take 1 tablet (2 mg) by mouth once daily.       topiramate (Topamax) 50 mg tablet Take 1 tablet (50 mg) by mouth 2 times a day.          ALLERGIES:  Allergies   Allergen Reactions    Shellfish Derived Anaphylaxis       COMPLETE REVIEW OF SYSTEMS:   Limited    PAIN ASSESSMENT: Negative for pain, history of chronic pain, or current treatment for a chronic pain condition.  GENERAL: No weight loss, malaise or fevers.  HEENT: Negative for frequent or significant headaches, No changes in hearing or vision, no nose bleeds or other nasal problems  NECK: Negative for lumps, goiter, pain and significant neck swelling  RESPIRATORY: Negative for cough, hemoptysis, wheezing or shortness of breath  CARDIOVASCULAR: Negative for chest pain, leg swelling or palpitations.  GI: No nausea, vomiting, or diarrhea  : No history of dysuria, frequency or incontinence.  MUSCULOSKELETAL: Negative for joint pain or swelling, back pain or muscle pain.  SKIN: Negative for lesions, rash, and itching.  PSYCH: Negative for sleep disturbance, mood disorder and recent psychosocial stressors.  HEMATOLOGY/LYMPHOLOGY: Negative for prolonged bleeding, bruising easily or swollen nodes.  ENDOCRINE: Negative for cold or heat intolerance, polyuria, polydipsia and goiter.  NEURO: No history of headaches, syncope, paralysis, seizures or tremors      OBJECTIVE  PHYSICAL EXAM:  Patient Vitals for the past 24 hrs:   BP Temp Temp src Pulse Resp SpO2 Height Weight   05/16/24 0738 126/74 37.1 °C (98.8 °F) Temporal 93 18 96 % -- --   05/16/24 0506 122/75 36.9 °C (98.4 °F) Temporal -- 18 97 % --  "--   05/16/24 0021 144/80 37.2 °C (98.9 °F) Temporal -- 18 98 % -- --   05/15/24 2128 -- -- -- -- -- -- 1.753 m (5' 9\") 108 kg (237 lb 7 oz)   05/15/24 2045 129/69 -- -- 96 16 95 % -- --   05/15/24 2030 140/75 -- -- 96 18 96 % -- --   05/15/24 2000 118/90 -- -- 94 16 96 % -- --   05/15/24 1903 124/85 -- -- 93 18 98 % -- --   05/15/24 1830 99/57 -- -- 89 16 97 % -- --   05/15/24 1730 123/70 -- -- 88 18 97 % -- --   05/15/24 1645 120/68 -- -- 88 16 97 % -- --   05/15/24 1634 123/73 -- -- 87 18 97 % -- --   05/15/24 1618 125/75 37.7 °C (99.9 °F) -- 88 16 98 % -- 114 kg (251 lb 1.7 oz)     Body mass index is 35.06 kg/m².  GENERAL: alert, no distress, cooperative- no visitors present at the bedside  SKIN: Skin color, texture, turgor normal. No rashes or lesions.  HEAD/SINUSES: No significant findings. Head atraumatic and normocephalic   EYES: PERRLA and EOMI  EARS: External ears normal  NOSE: Nares normal. Septum midline.  OROPHARYNX: Lips, mucosa, and tongue normal. gums normal. Tongue midline. Oropharynx normal.  NECK: no jugulovenous distention, no carotid bruits, carotid pulse normal contour, supple, thyroid is normal  BACK: No CVAT.  LUNGS: sym exp, no dullness to percussion, Lungs clear to auscultation. Good diaphragmatic excursion.   CARDIAC: normal S1 and S2; no rubs, murmurs, or gallops, s3 or s4.  PMI 5th ICS 1 cm lateral-  Chest - Symmetrical chest wall expansion  Lymphatic: Neck, axilla and groin neg  ABDOMEN: Abdomen soft, non-tender. BS normal. No masses or organomegaly. No hernia, No guarding, no rigidity   EXTREMITIES: Extremities normal. No deformities, clubbing or skin discoloration., No ulcers or edema  NEURO: Cranial nerves II-XII intact. Sensation intact and tone appears normal- MUNOZ  PULSES:  radial, brachial, femoral, and pedal pulses palpable 2+  GENITALIA/RECTAL: Deferred  MS: DIP, PIP changes of DJD- no clubbing, no effusion     t75  DATA:   Diagnostic tests reviewed for today's visit:    Most " recent labs  Most recent imaging    SIGNATURE: Cheko Flowers MD  DATE: May 16, 2024  TIME: 10:43 AM

## 2024-05-16 NOTE — CARE PLAN
Problem: Nutrition  Goal: Less than 5 days NPO/clear liquids  5/16/2024 1431 by Wendy Javier LPN  Outcome: Progressing  5/16/2024 1209 by Wendy Javier LPN  Outcome: Progressing  Goal: Oral intake greater than 50%  5/16/2024 1431 by Wendy Javier LPN  Outcome: Progressing  5/16/2024 1209 by Wendy Javier LPN  Outcome: Progressing  Goal: Oral intake greater 75%  5/16/2024 1431 by Wendy Javier LPN  Outcome: Progressing  5/16/2024 1209 by Wendy Javier LPN  Outcome: Progressing  Goal: Consume prescribed supplement  5/16/2024 1431 by Wendy Javier LPN  Outcome: Progressing  5/16/2024 1209 by Wendy Javier LPN  Outcome: Progressing  Goal: Adequate PO fluid intake  5/16/2024 1431 by eWndy Javier LPN  Outcome: Progressing  5/16/2024 1209 by Wendy Javier LPN  Outcome: Progressing  Goal: Nutrition support goals are met within 48 hrs  5/16/2024 1431 by Wendy Javier LPN  Outcome: Progressing  5/16/2024 1209 by Wendy Javier LPN  Outcome: Progressing  Goal: Nutrition support is meeting 75% of nutrient needs  5/16/2024 1431 by Wendy Javier LPN  Outcome: Progressing  5/16/2024 1209 by Wendy Javier LPN  Outcome: Progressing  Goal: Tube feed tolerance  5/16/2024 1431 by Wendy Javier LPN  Outcome: Progressing  5/16/2024 1209 by Wendy Javier LPN  Outcome: Progressing  Goal: BG  mg/dL  5/16/2024 1431 by Wendy Javier LPN  Outcome: Progressing  5/16/2024 1209 by Wendy Javier LPN  Outcome: Progressing  Goal: Lab values WNL  5/16/2024 1431 by Wendy Javier LPN  Outcome: Progressing  5/16/2024 1209 by Wendy Javier LPN  Outcome: Progressing  Goal: Electrolytes WNL  5/16/2024 1431 by Wendy Javier LPN  Outcome: Progressing  5/16/2024 1209 by Wendy Javier LPN  Outcome: Progressing  Goal: Promote healing  5/16/2024 1431 by Wendy Javier LPN  Outcome: Progressing  5/16/2024 1209 by Wendy Javier LPN  Outcome: Progressing  Goal: Maintain stable weight  5/16/2024 1431 by Wendy Javier LPN  Outcome: Progressing  5/16/2024 1209  by Wedny Hardy, LPN  Outcome: Progressing  Goal: Reduce weight from edema/fluid  5/16/2024 1431 by Wendy Javier LPN  Outcome: Progressing  5/16/2024 1209 by Wendy Javier LPN  Outcome: Progressing  Goal: Gradual weight gain  5/16/2024 1431 by Wendy Javier LPN  Outcome: Progressing  5/16/2024 1209 by Wendy Javier LPN  Outcome: Progressing  Goal: Improve ostomy output  5/16/2024 1431 by Wendy Javier LPN  Outcome: Progressing  5/16/2024 1209 by Wendy Javier LPN  Outcome: Progressing     Problem: Pain - Adult  Goal: Verbalizes/displays adequate comfort level or baseline comfort level  5/16/2024 1431 by Wendy Javier LPN  Outcome: Progressing  5/16/2024 1209 by Wendy Javier LPN  Outcome: Progressing     Problem: Safety - Adult  Goal: Free from fall injury  5/16/2024 1431 by Wendy Javier LPN  Outcome: Progressing  5/16/2024 1209 by Wendy Javier LPN  Outcome: Progressing     Problem: Discharge Planning  Goal: Discharge to home or other facility with appropriate resources  5/16/2024 1431 by Wendy Javier LPN  Outcome: Progressing  5/16/2024 1209 by Wendy Javier LPN  Outcome: Progressing     Problem: Chronic Conditions and Co-morbidities  Goal: Patient's chronic conditions and co-morbidity symptoms are monitored and maintained or improved  5/16/2024 1431 by Wendy Javier LPN  Outcome: Progressing  5/16/2024 1209 by Wendy Javier LPN  Outcome: Progressing   The patient's goals for the shift include  maintaining pt comfort and safety     The clinical goals for the shift include pt will remain safe through out the shift

## 2024-05-17 LAB
ATRIAL RATE: 86 BPM
BACTERIA UR CULT: ABNORMAL
FERRITIN SERPL-MCNC: 256 NG/ML (ref 8–150)
HEMOCCULT SP1 STL QL: NEGATIVE
P AXIS: 32 DEGREES
P OFFSET: 195 MS
P ONSET: 136 MS
PR INTERVAL: 158 MS
Q ONSET: 215 MS
QRS COUNT: 14 BEATS
QRS DURATION: 88 MS
QT INTERVAL: 348 MS
QTC CALCULATION(BAZETT): 416 MS
QTC FREDERICIA: 392 MS
R AXIS: 8 DEGREES
T AXIS: 18 DEGREES
T OFFSET: 389 MS
VENTRICULAR RATE: 86 BPM

## 2024-05-17 PROCEDURE — 2500000004 HC RX 250 GENERAL PHARMACY W/ HCPCS (ALT 636 FOR OP/ED): Performed by: PHYSICIAN ASSISTANT

## 2024-05-17 PROCEDURE — 2500000004 HC RX 250 GENERAL PHARMACY W/ HCPCS (ALT 636 FOR OP/ED): Performed by: PHARMACIST

## 2024-05-17 PROCEDURE — 2500000001 HC RX 250 WO HCPCS SELF ADMINISTERED DRUGS (ALT 637 FOR MEDICARE OP): Performed by: SPECIALIST

## 2024-05-17 PROCEDURE — 2500000001 HC RX 250 WO HCPCS SELF ADMINISTERED DRUGS (ALT 637 FOR MEDICARE OP): Performed by: PHYSICIAN ASSISTANT

## 2024-05-17 PROCEDURE — 82270 OCCULT BLOOD FECES: CPT | Performed by: SPECIALIST

## 2024-05-17 PROCEDURE — 2500000004 HC RX 250 GENERAL PHARMACY W/ HCPCS (ALT 636 FOR OP/ED): Performed by: SPECIALIST

## 2024-05-17 PROCEDURE — 1200000002 HC GENERAL ROOM WITH TELEMETRY DAILY

## 2024-05-17 RX ADMIN — PANTOPRAZOLE SODIUM 40 MG: 40 TABLET, DELAYED RELEASE ORAL at 06:45

## 2024-05-17 RX ADMIN — GABAPENTIN 600 MG: 300 CAPSULE ORAL at 10:02

## 2024-05-17 RX ADMIN — FERROUS SULFATE TAB 325 MG (65 MG ELEMENTAL FE) 1 TABLET: 325 (65 FE) TAB at 10:02

## 2024-05-17 RX ADMIN — BACLOFEN 10 MG: 10 TABLET ORAL at 14:33

## 2024-05-17 RX ADMIN — GABAPENTIN 600 MG: 300 CAPSULE ORAL at 14:33

## 2024-05-17 RX ADMIN — AMLODIPINE BESYLATE 5 MG: 5 TABLET ORAL at 10:03

## 2024-05-17 RX ADMIN — Medication 3 MG: at 21:56

## 2024-05-17 RX ADMIN — BACLOFEN 10 MG: 10 TABLET ORAL at 21:58

## 2024-05-17 RX ADMIN — ATORVASTATIN CALCIUM 10 MG: 10 TABLET, FILM COATED ORAL at 10:03

## 2024-05-17 RX ADMIN — BACLOFEN 10 MG: 10 TABLET ORAL at 10:03

## 2024-05-17 RX ADMIN — DOCUSATE SODIUM 100 MG: 100 CAPSULE, LIQUID FILLED ORAL at 10:03

## 2024-05-17 RX ADMIN — GABAPENTIN 600 MG: 300 CAPSULE ORAL at 21:57

## 2024-05-17 RX ADMIN — IRON SUCROSE 100 MG: 20 INJECTION, SOLUTION INTRAVENOUS at 06:45

## 2024-05-17 RX ADMIN — DOCUSATE SODIUM 100 MG: 100 CAPSULE, LIQUID FILLED ORAL at 21:57

## 2024-05-17 RX ADMIN — CEFTRIAXONE 2 G: 2 INJECTION, POWDER, FOR SOLUTION INTRAMUSCULAR; INTRAVENOUS at 21:58

## 2024-05-17 RX ADMIN — SODIUM CHLORIDE 75 ML/HR: 9 INJECTION, SOLUTION INTRAVENOUS at 14:42

## 2024-05-17 ASSESSMENT — COGNITIVE AND FUNCTIONAL STATUS - GENERAL
CLIMB 3 TO 5 STEPS WITH RAILING: TOTAL
PERSONAL GROOMING: A LITTLE
TURNING FROM BACK TO SIDE WHILE IN FLAT BAD: A LOT
TOILETING: A LOT
CLIMB 3 TO 5 STEPS WITH RAILING: TOTAL
EATING MEALS: A LITTLE
DAILY ACTIVITIY SCORE: 14
TURNING FROM BACK TO SIDE WHILE IN FLAT BAD: A LOT
PERSONAL GROOMING: A LITTLE
HELP NEEDED FOR BATHING: A LOT
MOBILITY SCORE: 10
DRESSING REGULAR UPPER BODY CLOTHING: A LOT
HELP NEEDED FOR BATHING: A LOT
MOVING TO AND FROM BED TO CHAIR: A LOT
WALKING IN HOSPITAL ROOM: TOTAL
STANDING UP FROM CHAIR USING ARMS: TOTAL
MOBILITY SCORE: 10
WALKING IN HOSPITAL ROOM: TOTAL
DRESSING REGULAR LOWER BODY CLOTHING: A LOT
MOVING FROM LYING ON BACK TO SITTING ON SIDE OF FLAT BED WITH BEDRAILS: A LITTLE
DRESSING REGULAR UPPER BODY CLOTHING: A LOT
MOVING TO AND FROM BED TO CHAIR: A LOT
EATING MEALS: A LITTLE
MOVING FROM LYING ON BACK TO SITTING ON SIDE OF FLAT BED WITH BEDRAILS: A LITTLE
DRESSING REGULAR LOWER BODY CLOTHING: A LOT
DAILY ACTIVITIY SCORE: 14
TOILETING: A LOT
STANDING UP FROM CHAIR USING ARMS: TOTAL

## 2024-05-17 ASSESSMENT — PAIN SCALES - GENERAL
PAINLEVEL_OUTOF10: 0 - NO PAIN
PAINLEVEL_OUTOF10: 0 - NO PAIN

## 2024-05-17 NOTE — CARE PLAN
The clinical goals for the shift include patient will be turned throughout shift to maintain skin integrety      Problem: Nutrition  Goal: Less than 5 days NPO/clear liquids  Outcome: Progressing  Goal: Oral intake greater than 50%  Outcome: Progressing  Goal: Oral intake greater 75%  Outcome: Progressing  Goal: Consume prescribed supplement  Outcome: Progressing  Goal: Adequate PO fluid intake  Outcome: Progressing  Goal: Nutrition support goals are met within 48 hrs  Outcome: Progressing  Goal: Nutrition support is meeting 75% of nutrient needs  Outcome: Progressing  Goal: Tube feed tolerance  Outcome: Progressing  Goal: BG  mg/dL  Outcome: Progressing  Goal: Lab values WNL  Outcome: Progressing  Goal: Electrolytes WNL  Outcome: Progressing  Goal: Promote healing  Outcome: Progressing  Goal: Maintain stable weight  Outcome: Progressing  Goal: Reduce weight from edema/fluid  Outcome: Progressing  Goal: Gradual weight gain  Outcome: Progressing  Goal: Improve ostomy output  Outcome: Progressing     Problem: Pain - Adult  Goal: Verbalizes/displays adequate comfort level or baseline comfort level  Outcome: Progressing     Problem: Safety - Adult  Goal: Free from fall injury  Outcome: Progressing     Problem: Discharge Planning  Goal: Discharge to home or other facility with appropriate resources  Outcome: Progressing     Problem: Chronic Conditions and Co-morbidities  Goal: Patient's chronic conditions and co-morbidity symptoms are monitored and maintained or improved  Outcome: Progressing

## 2024-05-17 NOTE — PROGRESS NOTES
Fely Palacios is a 48 y.o. female on day 2 of admission presenting with Altered mental status.      Subjective   No distrerss        Objective     Last Recorded Vitals  /72 (Patient Position: Lying)   Pulse 85   Temp 36.5 °C (97.7 °F) (Temporal)   Resp 18   Wt 108 kg (237 lb 7 oz)   SpO2 96%   Intake/Output last 3 Shifts:    Intake/Output Summary (Last 24 hours) at 5/17/2024 0642  Last data filed at 5/16/2024 2054  Gross per 24 hour   Intake 1000 ml   Output 830 ml   Net 170 ml       Admission Weight  Weight: 114 kg (251 lb 1.7 oz) (05/15/24 1618)    Daily Weight  05/15/24 : 108 kg (237 lb 7 oz)    Image Results  Electrocardiogram, 12-lead PRN ACS symptoms  Normal sinus rhythm  Normal ECG  No previous ECGs available      PHYSICAL EXAM  NEUROLOGICAL: No abnormal movements, no changes from before  HEENT: Head atraumatic, perrl,eomi, no oral lesions, no ear rash   NECK: Supple, no ln, jvp flat, thyroid palp  HEART: S1, S2, no added sound  LUNGS: CTAB, no crackles, no rales, no wheezing, no dullness to percussion, sym exp  EXTREMITIES: no edema  ABDOMEN: Soft, nontender, bowel sound positive, no organomegaly  SKIN: No change  EYES: No changes, perrl, eomi,   ENT: No change    JOINT: No change  Relevant Results               Assessment/Plan        Principal Problem:    Altered mental status  Active Problems:    Confusion    Multiple sclerosis exacerbation (Multi)    UTI (urinary tract infection)    Leukocytosis    Multiple sclerosis (Multi)    Cystitis , cont iv till am and dc     PLAN    Dc am  Cont ivf until dc   t32              Cheko Flowers MD

## 2024-05-17 NOTE — PROGRESS NOTES
05/17/24 1036   Discharge Planning   Assistance Needed occult stool ordered -per nurse black stool noted   Type of Residence Nursing home/residential care  (LTC [facility])   Type of Post Acute Facility Services Long term care   Patient expects to be discharged to: notified by Attending patient will be medically ready to return back to facility 5/18/2024-facility notified-no barriers to return   Does the patient need discharge transport arranged? Yes   RoundTrip coordination needed? Yes   Has discharge transport been arranged? No

## 2024-05-17 NOTE — CONSULTS
Ostomy Wound Care Consult     Visit Date: 5/17/2024      Patient Name: Fely Palacios         MRN: [id number]           YOB: 1975     WO nursing visit outcome: Assessment of urostomy and pouch change      WO next scheduled visit/plan: WOC will follow while inpatient     Stoma Type: Urostomy with 2 stents present   [place]: No  Diameter: 1 1/8  Location: RLE  Protrusion:  slightly budded   Mucosal Condition and Color: Moist, Red  Mucocutaneous Junction: Intact  Peristomal Skin: Irritant Dermatitis/Denuded  Location of Skin Impairment: at 5-8 o'clock   Peristomal Contour: Rounded  Supportive Tissue: Very soft  Character of Output: Marcia urine with large amount of mucous shreds  Emptying Frequency: as needed   Removed/Current Pouching System: removed two piece convatec pouching system. Applied barrier ring, 2 piece RED 2 1/4 soft convex wafer with pouch attached to gravity.   Current Wearing Time: unknown     Recommendations:   Skin Care: apply stoma powder as needed to denuded skin       Photo: 5/17/2024        Odette Muhammad RN BSN,[facility],CWOCN  [phone]   5/17/2024  5:10 PM

## 2024-05-18 ENCOUNTER — HOSPITAL ENCOUNTER (OUTPATIENT)
Dept: CARDIOLOGY | Facility: HOSPITAL | Age: 49
Discharge: HOME | DRG: 698 | End: 2024-05-18
Payer: MEDICARE

## 2024-05-18 VITALS
RESPIRATION RATE: 18 BRPM | HEART RATE: 72 BPM | DIASTOLIC BLOOD PRESSURE: 69 MMHG | HEIGHT: 69 IN | WEIGHT: 237.44 LBS | SYSTOLIC BLOOD PRESSURE: 129 MMHG | BODY MASS INDEX: 35.17 KG/M2 | OXYGEN SATURATION: 98 % | TEMPERATURE: 98.4 F

## 2024-05-18 PROCEDURE — 93005 ELECTROCARDIOGRAM TRACING: CPT

## 2024-05-18 ASSESSMENT — PAIN SCALES - GENERAL: PAINLEVEL_OUTOF10: 0 - NO PAIN

## 2024-05-18 ASSESSMENT — COGNITIVE AND FUNCTIONAL STATUS - GENERAL
STANDING UP FROM CHAIR USING ARMS: TOTAL
HELP NEEDED FOR BATHING: TOTAL
MOVING FROM LYING ON BACK TO SITTING ON SIDE OF FLAT BED WITH BEDRAILS: A LOT
WALKING IN HOSPITAL ROOM: TOTAL
DAILY ACTIVITIY SCORE: 11
DRESSING REGULAR LOWER BODY CLOTHING: TOTAL
TOILETING: TOTAL
PERSONAL GROOMING: A LOT
DRESSING REGULAR UPPER BODY CLOTHING: A LOT
MOBILITY SCORE: 8
CLIMB 3 TO 5 STEPS WITH RAILING: TOTAL
TURNING FROM BACK TO SIDE WHILE IN FLAT BAD: A LOT
MOVING TO AND FROM BED TO CHAIR: TOTAL

## 2024-05-18 NOTE — PROGRESS NOTES
"SW met with Pt regarding plans to return to [facility] today at noon. Pt stated \"beautiful\" and was happy to be returning to facility.  Pt's father also updated. MD and nursing also aware and agreeable with plan.  "

## 2024-05-18 NOTE — DISCHARGE SUMMARY
Discharge Diagnosis  Altered mental status    Issues Requiring Follow-Up  none    Discharge Meds     Your medication list        CONTINUE taking these medications        Instructions Last Dose Given Next Dose Due   acetaminophen 325 mg tablet  Commonly known as: Tylenol           amLODIPine 5 mg tablet  Commonly known as: Norvasc           atorvastatin 10 mg tablet  Commonly known as: Lipitor           baclofen 10 mg tablet  Commonly known as: Lioresal           cilostazol 50 mg tablet  Commonly known as: Pletal           cranberry 500 mg capsule           docusate sodium 100 mg capsule  Commonly known as: Colace           ferrous sulfate (325 mg ferrous sulfate) tablet           gabapentin 600 mg tablet  Commonly known as: Neurontin           Mayzent 2 mg tablet tablet  Generic drug: siponimod           metoprolol succinate XL 25 mg 24 hr tablet  Commonly known as: Toprol-XL           sertraline 50 mg tablet  Commonly known as: Zoloft           topiramate 50 mg tablet  Commonly known as: Topamax                    Test Results Pending At Discharge  Pending Labs       No current pending labs.            Hospital Course   Urine culture done   Seen by neuro   Iv antibiotics given and dc on day of dc   Ivf given       Pertinent Physical Exam At Time of Discharge  Physical Exam  See progress note     Outpatient Follow-Up  No future appointments.      Cheko Flowers MD

## 2024-05-18 NOTE — CARE PLAN
Problem: Pain - Adult  Goal: Verbalizes/displays adequate comfort level or baseline comfort level  Outcome: Progressing     Problem: Skin  Goal: Decreased wound size/increased tissue granulation at next dressing change  Flowsheets (Taken 5/18/2024 4919)  Decreased wound size/increased tissue granulation at next dressing change: Promote sleep for wound healing   The patient's goals for the shift include      The clinical goals for the shift include pt will remain safe throughout the shift    Over the shift, the patient did not make progress toward the following goals. Barriers to progression include . Recommendations to address these barriers include .

## 2024-05-18 NOTE — PROGRESS NOTES
Fely Palacios is a 48 y.o. female on day 3 of admission presenting with Altered mental status.      Subjective     No issues          Objective     Last Recorded Vitals  /69   Pulse 72   Temp 36.9 °C (98.4 °F)   Resp 18   Wt 108 kg (237 lb 7 oz)   SpO2 98%   Intake/Output last 3 Shifts:    Intake/Output Summary (Last 24 hours) at 5/18/2024 1033  Last data filed at 5/18/2024 0650  Gross per 24 hour   Intake 2000 ml   Output 1150 ml   Net 850 ml       Admission Weight  Weight: 114 kg (251 lb 1.7 oz) (05/15/24 1618)    Daily Weight  05/15/24 : 108 kg (237 lb 7 oz)    Image Results  Electrocardiogram, 12-lead PRN ACS symptoms  Normal sinus rhythm  Normal ECG  No previous ECGs available  See ED provider note for full interpretation and clinical correlation  Confirmed by Helena Rossi ([id number]) on 5/17/2024 10:32:55 AM      PHYSICAL EXAM  NEUROLOGICAL: No abnormal movements, no changes from before  HEENT: Head atraumatic, perrl,eomi, no oral lesions, no ear rash   NECK: Supple, no ln, jvp flat, thyroid palp  HEART: S1, S2, no added sound  LUNGS: CTAB, no crackles, no rales, no wheezing, no dullness to percussion, sym exp  EXTREMITIES: no edema  ABDOMEN: Soft, nontender, bowel sound positive, no organomegaly  SKIN: No change  EYES: No changes, perrl, eomi,   ENT: No change    JOINT: No change  Relevant Results               Assessment/Plan                  Principal Problem:    Altered mental status  Active Problems:    Confusion    Multiple sclerosis exacerbation (Multi)    UTI (urinary tract infection)    Leukocytosis    Multiple sclerosis (Multi)    PLAN  Dc on no antibiotics as it was cystitis and had 4 days of iv antibiotisc  t32              Cheko Flowers MD

## 2024-05-18 NOTE — CARE PLAN
Problem: Nutrition  Goal: Less than 5 days NPO/clear liquids  Outcome: Adequate for Discharge  Goal: Oral intake greater than 50%  Outcome: Adequate for Discharge  Goal: Oral intake greater 75%  Outcome: Adequate for Discharge  Goal: Consume prescribed supplement  Outcome: Adequate for Discharge  Goal: Adequate PO fluid intake  Outcome: Adequate for Discharge  Goal: Nutrition support goals are met within 48 hrs  Outcome: Adequate for Discharge  Goal: Nutrition support is meeting 75% of nutrient needs  Outcome: Adequate for Discharge  Goal: Tube feed tolerance  Outcome: Adequate for Discharge  Goal: BG  mg/dL  Outcome: Adequate for Discharge  Goal: Lab values WNL  Outcome: Adequate for Discharge  Goal: Electrolytes WNL  Outcome: Adequate for Discharge  Goal: Promote healing  Outcome: Adequate for Discharge  Goal: Maintain stable weight  Outcome: Adequate for Discharge  Goal: Reduce weight from edema/fluid  Outcome: Adequate for Discharge  Goal: Gradual weight gain  Outcome: Adequate for Discharge  Goal: Improve ostomy output  Outcome: Adequate for Discharge     Problem: Pain - Adult  Goal: Verbalizes/displays adequate comfort level or baseline comfort level  Outcome: Adequate for Discharge     Problem: Safety - Adult  Goal: Free from fall injury  Outcome: Adequate for Discharge     Problem: Discharge Planning  Goal: Discharge to home or other facility with appropriate resources  Outcome: Adequate for Discharge     Problem: Chronic Conditions and Co-morbidities  Goal: Patient's chronic conditions and co-morbidity symptoms are monitored and maintained or improved  Outcome: Adequate for Discharge     Problem: Skin  Goal: Decreased wound size/increased tissue granulation at next dressing change  5/18/2024 1209 by Lori Mckinney RN  Outcome: Adequate for Discharge  5/18/2024 1209 by Lori Mckinney RN  Flowsheets (Taken 5/18/2024 1209)  Decreased wound size/increased tissue granulation at next dressing change:  Protective dressings over bony prominences  5/18/2024 1208 by Lori Mckinney RN  Reactivated  5/18/2024 1208 by Lori Mckinney RN  Outcome: Adequate for Discharge  Goal: Participates in plan/prevention/treatment measures  5/18/2024 1209 by Lori Mckinney RN  Outcome: Adequate for Discharge  5/18/2024 1209 by Lori Mckinney RN  Outcome: Progressing  Flowsheets (Taken 5/18/2024 1209)  Participates in plan/prevention/treatment measures: Elevate heels  5/18/2024 1208 by Lori Mckinney RN  Reactivated  5/18/2024 1208 by Lori Mckinney RN  Outcome: Adequate for Discharge  Goal: Prevent/manage excess moisture  Outcome: Adequate for Discharge  Goal: Prevent/minimize sheer/friction injuries  Outcome: Adequate for Discharge  Goal: Promote/optimize nutrition  Outcome: Adequate for Discharge  Goal: Promote skin healing  Outcome: Adequate for Discharge

## 2024-05-24 NOTE — DOCUMENTATION CLARIFICATION NOTE
"    PATIENT:               HARLEY GALAVIZ  ACCT #:                  [id number]  MRN:                       [id number]  :                       1975  ADMIT DATE:       5/15/2024 4:12 PM  DISCH DATE:        2024 1:58 PM  RESPONDING PROVIDER #:        [id number]          PROVIDER RESPONSE TEXT:    Metabolic Encephalopathy 2/2 UTI    CDI QUERY TEXT:    Clarification    Question: Please validate the type of encephalopathy as:    When answering this query, please exercise your independent professional judgment. The fact that a question is being asked, does not imply that any particular answer is desired or expected.    The patient's clinical indicators include:  Clinical Information: 49 y/o female admitted 5/15-  with ams, treated for UTI. Clarification has been requested for encephalopathy type.    5/15 H and P, Dr. Raman: \"metabolic encephalopathy vs MS flare up\"    5/15 Neuro note, Dr. Trevino: \"Transient encephalopathy in the context of low-grade fever and systemic infection, which is being treated.  Multiple sclerosis with paraparesis, nystagmus and early-onset dementia.  The presentation is not suggestive of either MS exacerbation or stroke/TIA\"    Treatment: IV fluids, IV Ceftriaxone 5/15-     Risk Factors: MS, UTI  Options provided:  -- Metabolic Encephalopathy 2/2 UTI  -- Other Encephalopathy, Please specify type and cause  -- Other - I will add my own diagnosis  -- Refer to Clinical Documentation Reviewer    Query created by: Gisell Dang on 2024 2:30 PM      Electronically signed by:  CHIQUIS RAMAN MD 2024 8:47 PM          "

## 2024-05-24 NOTE — DOCUMENTATION CLARIFICATION NOTE
"    PATIENT:               HARLEY GALAVIZ  ACCT #:                  [id number]  MRN:                       [id number]  :                       1975  ADMIT DATE:       5/15/2024 4:12 PM  DISCH DATE:        2024 1:58 PM  RESPONDING PROVIDER #:        [id number]          PROVIDER RESPONSE TEXT:    UTI related to urostomy    CDI QUERY TEXT:    Clarification    Question: Please validate the relationship between the UTI and the urostomy as:    When answering this query, please exercise your independent professional judgment. The fact that a question is being asked, does not imply that any particular answer is desired or expected.    The patient's clinical indicators include:  49 y/o female, admitted 5/15- . Clarification is requested for the following documentation:     H and PDr. Raman: \"Possible uti 2/ urostomy --UC pending\". No documentation of UTI on DC Summary.    Clinical Indicators: RUQ urostomy, 5/15 urine culture- multiple organisms    Treatment: IV Ceftriaxone 5/15- , IV fluids, wound consult for pouch assessment and change    Risk Factors: history MS, urostomy  Options provided:  -- UTI related to urostomy  -- UTI unrelated to urostomy  -- Other - I will add my own diagnosis  -- Refer to Clinical Documentation Reviewer    Query created by: Gisell Dang on 2024 7:44 AM      Electronically signed by:  CHIQUIS RAMAN MD 2024 8:47 PM          "

## 2024-05-29 LAB
ATRIAL RATE: 75 BPM
P AXIS: 34 DEGREES
P OFFSET: 196 MS
P ONSET: 141 MS
PR INTERVAL: 152 MS
Q ONSET: 217 MS
QRS COUNT: 12 BEATS
QRS DURATION: 90 MS
QT INTERVAL: 402 MS
QTC CALCULATION(BAZETT): 448 MS
QTC FREDERICIA: 432 MS
R AXIS: 11 DEGREES
T AXIS: 19 DEGREES
T OFFSET: 418 MS
VENTRICULAR RATE: 75 BPM